# Patient Record
Sex: FEMALE | Race: WHITE | HISPANIC OR LATINO | URBAN - METROPOLITAN AREA
[De-identification: names, ages, dates, MRNs, and addresses within clinical notes are randomized per-mention and may not be internally consistent; named-entity substitution may affect disease eponyms.]

---

## 2018-05-22 ENCOUNTER — EMERGENCY (EMERGENCY)
Facility: HOSPITAL | Age: 78
LOS: 1 days | Discharge: HOME | End: 2018-05-22
Attending: EMERGENCY MEDICINE | Admitting: EMERGENCY MEDICINE
Payer: COMMERCIAL

## 2018-05-22 VITALS
RESPIRATION RATE: 18 BRPM | DIASTOLIC BLOOD PRESSURE: 74 MMHG | OXYGEN SATURATION: 100 % | SYSTOLIC BLOOD PRESSURE: 111 MMHG | WEIGHT: 134.04 LBS | TEMPERATURE: 98 F | HEART RATE: 100 BPM | HEIGHT: 63 IN

## 2018-05-22 VITALS
HEART RATE: 68 BPM | OXYGEN SATURATION: 99 % | SYSTOLIC BLOOD PRESSURE: 146 MMHG | DIASTOLIC BLOOD PRESSURE: 74 MMHG | RESPIRATION RATE: 17 BRPM | TEMPERATURE: 97 F

## 2018-05-22 LAB
ACETONE SERPL-MCNC: ABNORMAL
ALBUMIN SERPL ELPH-MCNC: 4.4 G/DL — SIGNIFICANT CHANGE UP (ref 3.3–5.2)
ALP SERPL-CCNC: 59 U/L — SIGNIFICANT CHANGE UP (ref 40–120)
ALT FLD-CCNC: 56 U/L — HIGH
ANION GAP SERPL CALC-SCNC: 19 MMOL/L — HIGH (ref 5–17)
APPEARANCE UR: CLEAR — SIGNIFICANT CHANGE UP
AST SERPL-CCNC: 67 U/L — HIGH
BACTERIA # UR AUTO: ABNORMAL
BASE EXCESS BLDV CALC-SCNC: 0.6 MMOL/L — SIGNIFICANT CHANGE UP (ref -2–2)
BASOPHILS # BLD AUTO: 0 K/UL — SIGNIFICANT CHANGE UP (ref 0–0.2)
BASOPHILS NFR BLD AUTO: 0 % — SIGNIFICANT CHANGE UP (ref 0–2)
BILIRUB SERPL-MCNC: 0.5 MG/DL — SIGNIFICANT CHANGE UP (ref 0.4–2)
BILIRUB UR-MCNC: NEGATIVE — SIGNIFICANT CHANGE UP
BUN SERPL-MCNC: 15 MG/DL — SIGNIFICANT CHANGE UP (ref 8–20)
CA-I SERPL-SCNC: 1.21 MMOL/L — SIGNIFICANT CHANGE UP (ref 1.15–1.33)
CALCIUM SERPL-MCNC: 10.5 MG/DL — HIGH (ref 8.6–10.2)
CHLORIDE BLDV-SCNC: 104 MMOL/L — SIGNIFICANT CHANGE UP (ref 98–107)
CHLORIDE SERPL-SCNC: 99 MMOL/L — SIGNIFICANT CHANGE UP (ref 98–107)
CO2 SERPL-SCNC: 22 MMOL/L — SIGNIFICANT CHANGE UP (ref 22–29)
COLOR SPEC: YELLOW — SIGNIFICANT CHANGE UP
CREAT SERPL-MCNC: 0.9 MG/DL — SIGNIFICANT CHANGE UP (ref 0.5–1.3)
DIFF PNL FLD: ABNORMAL
EOSINOPHIL # BLD AUTO: 0 K/UL — SIGNIFICANT CHANGE UP (ref 0–0.5)
EOSINOPHIL NFR BLD AUTO: 0 % — SIGNIFICANT CHANGE UP (ref 0–6)
EPI CELLS # UR: SIGNIFICANT CHANGE UP
GAS PNL BLDV: 141 MMOL/L — SIGNIFICANT CHANGE UP (ref 135–145)
GAS PNL BLDV: SIGNIFICANT CHANGE UP
GAS PNL BLDV: SIGNIFICANT CHANGE UP
GLUCOSE BLDV-MCNC: 90 MG/DL — SIGNIFICANT CHANGE UP (ref 70–99)
GLUCOSE SERPL-MCNC: 93 MG/DL — SIGNIFICANT CHANGE UP (ref 70–115)
GLUCOSE UR QL: 250 MG/DL
HCO3 BLDV-SCNC: 25 MMOL/L — SIGNIFICANT CHANGE UP (ref 21–29)
HCT VFR BLD CALC: 34.8 % — LOW (ref 37–47)
HCT VFR BLDA CALC: 38 — LOW (ref 39–50)
HGB BLD CALC-MCNC: 12.4 G/DL — SIGNIFICANT CHANGE UP (ref 11.5–15.5)
HGB BLD-MCNC: 12 G/DL — SIGNIFICANT CHANGE UP (ref 12–16)
HYALINE CASTS # UR AUTO: ABNORMAL /LPF
KETONES UR-MCNC: NEGATIVE — SIGNIFICANT CHANGE UP
LACTATE BLDV-MCNC: 2.7 MMOL/L — HIGH (ref 0.5–2)
LEUKOCYTE ESTERASE UR-ACNC: ABNORMAL
LYMPHOCYTES # BLD AUTO: 4.5 K/UL — SIGNIFICANT CHANGE UP (ref 1–4.8)
LYMPHOCYTES # BLD AUTO: 64 % — HIGH (ref 20–55)
MCHC RBC-ENTMCNC: 30.1 PG — SIGNIFICANT CHANGE UP (ref 27–31)
MCHC RBC-ENTMCNC: 34.5 G/DL — SIGNIFICANT CHANGE UP (ref 32–36)
MCV RBC AUTO: 87.2 FL — SIGNIFICANT CHANGE UP (ref 81–99)
MONOCYTES # BLD AUTO: 0.9 K/UL — HIGH (ref 0–0.8)
MONOCYTES NFR BLD AUTO: 8 % — SIGNIFICANT CHANGE UP (ref 3–10)
NEUTROPHILS # BLD AUTO: 2.6 K/UL — SIGNIFICANT CHANGE UP (ref 1.8–8)
NEUTROPHILS NFR BLD AUTO: 28 % — LOW (ref 37–73)
NITRITE UR-MCNC: POSITIVE
OTHER CELLS CSF MANUAL: 14 ML/DL — LOW (ref 18–22)
PCO2 BLDV: 41 MMHG — SIGNIFICANT CHANGE UP (ref 35–50)
PH BLDV: 7.4 — SIGNIFICANT CHANGE UP (ref 7.32–7.43)
PH UR: 5 — SIGNIFICANT CHANGE UP (ref 5–8)
PLAT MORPH BLD: NORMAL — SIGNIFICANT CHANGE UP
PLATELET # BLD AUTO: 223 K/UL — SIGNIFICANT CHANGE UP (ref 150–400)
PO2 BLDV: 43 MMHG — SIGNIFICANT CHANGE UP (ref 25–45)
POTASSIUM BLDV-SCNC: 3.2 MMOL/L — LOW (ref 3.4–4.5)
POTASSIUM SERPL-MCNC: 3.3 MMOL/L — LOW (ref 3.5–5.3)
POTASSIUM SERPL-SCNC: 3.3 MMOL/L — LOW (ref 3.5–5.3)
PROT SERPL-MCNC: 7.7 G/DL — SIGNIFICANT CHANGE UP (ref 6.6–8.7)
PROT UR-MCNC: 30 MG/DL
RBC # BLD: 3.99 M/UL — LOW (ref 4.4–5.2)
RBC # FLD: 12.2 % — SIGNIFICANT CHANGE UP (ref 11–15.6)
RBC BLD AUTO: NORMAL — SIGNIFICANT CHANGE UP
RBC CASTS # UR COMP ASSIST: ABNORMAL /HPF (ref 0–4)
SAO2 % BLDV: 81 % — SIGNIFICANT CHANGE UP
SODIUM SERPL-SCNC: 140 MMOL/L — SIGNIFICANT CHANGE UP (ref 135–145)
SP GR SPEC: 1.02 — SIGNIFICANT CHANGE UP (ref 1.01–1.02)
UROBILINOGEN FLD QL: 1 MG/DL
WBC # BLD: 8.1 K/UL — SIGNIFICANT CHANGE UP (ref 4.8–10.8)
WBC # FLD AUTO: 8.1 K/UL — SIGNIFICANT CHANGE UP (ref 4.8–10.8)
WBC UR QL: ABNORMAL

## 2018-05-22 PROCEDURE — 99283 EMERGENCY DEPT VISIT LOW MDM: CPT

## 2018-05-22 PROCEDURE — 80053 COMPREHEN METABOLIC PANEL: CPT

## 2018-05-22 PROCEDURE — 81001 URINALYSIS AUTO W/SCOPE: CPT

## 2018-05-22 PROCEDURE — 82009 KETONE BODYS QUAL: CPT

## 2018-05-22 PROCEDURE — T1013: CPT

## 2018-05-22 PROCEDURE — 36415 COLL VENOUS BLD VENIPUNCTURE: CPT

## 2018-05-22 PROCEDURE — 82803 BLOOD GASES ANY COMBINATION: CPT

## 2018-05-22 PROCEDURE — 85014 HEMATOCRIT: CPT

## 2018-05-22 PROCEDURE — 99284 EMERGENCY DEPT VISIT MOD MDM: CPT

## 2018-05-22 PROCEDURE — 82435 ASSAY OF BLOOD CHLORIDE: CPT

## 2018-05-22 PROCEDURE — 84295 ASSAY OF SERUM SODIUM: CPT

## 2018-05-22 PROCEDURE — 82962 GLUCOSE BLOOD TEST: CPT

## 2018-05-22 PROCEDURE — 82947 ASSAY GLUCOSE BLOOD QUANT: CPT

## 2018-05-22 PROCEDURE — 83605 ASSAY OF LACTIC ACID: CPT

## 2018-05-22 PROCEDURE — 84132 ASSAY OF SERUM POTASSIUM: CPT

## 2018-05-22 PROCEDURE — 82330 ASSAY OF CALCIUM: CPT

## 2018-05-22 PROCEDURE — 85027 COMPLETE CBC AUTOMATED: CPT

## 2018-05-22 RX ORDER — SODIUM CHLORIDE 9 MG/ML
2000 INJECTION INTRAMUSCULAR; INTRAVENOUS; SUBCUTANEOUS ONCE
Qty: 0 | Refills: 0 | Status: COMPLETED | OUTPATIENT
Start: 2018-05-22 | End: 2018-05-22

## 2018-05-22 RX ORDER — DONEPEZIL HYDROCHLORIDE 10 MG/1
0 TABLET, FILM COATED ORAL
Qty: 0 | Refills: 0 | COMMUNITY

## 2018-05-22 RX ORDER — POTASSIUM CHLORIDE 20 MEQ
40 PACKET (EA) ORAL ONCE
Qty: 0 | Refills: 0 | Status: COMPLETED | OUTPATIENT
Start: 2018-05-22 | End: 2018-05-22

## 2018-05-22 RX ORDER — CEPHALEXIN 500 MG
1 CAPSULE ORAL
Qty: 28 | Refills: 0 | OUTPATIENT
Start: 2018-05-22 | End: 2018-05-28

## 2018-05-22 RX ORDER — CEPHALEXIN 500 MG
500 CAPSULE ORAL ONCE
Qty: 0 | Refills: 0 | Status: COMPLETED | OUTPATIENT
Start: 2018-05-22 | End: 2018-05-22

## 2018-05-22 RX ORDER — POTASSIUM CHLORIDE 20 MEQ
4 PACKET (EA) ORAL
Qty: 4 | Refills: 0 | OUTPATIENT
Start: 2018-05-22 | End: 2018-05-22

## 2018-05-22 RX ORDER — METFORMIN HYDROCHLORIDE 850 MG/1
0 TABLET ORAL
Qty: 0 | Refills: 0 | COMMUNITY

## 2018-05-22 RX ADMIN — SODIUM CHLORIDE 2000 MILLILITER(S): 9 INJECTION INTRAMUSCULAR; INTRAVENOUS; SUBCUTANEOUS at 18:10

## 2018-05-22 RX ADMIN — Medication 500 MILLIGRAM(S): at 21:14

## 2018-05-22 RX ADMIN — Medication 40 MILLIEQUIVALENT(S): at 21:14

## 2018-05-22 NOTE — ED PROVIDER NOTE - ATTENDING CONTRIBUTION TO CARE
I, Martine Rausch, independently evaluated the patient. The APN made the initial evaluation and discussed history, physical and plan with me and I agree. I examined the patient noting RRR, lungs CTA and a soft, non-tender abdomen, and discussed lab results and the importance of proper compliance with her medication. I discussed indications to return to the ED and the importance of proper follow up with PMD. Patient verbalizes understanding and is comfortable with discharge at this time.

## 2018-05-22 NOTE — ED PROVIDER NOTE - PROGRESS NOTE DETAILS
I s/w Dr. Landers, patient does have diabetes and is taking Metformin 500mg qd. NP NOTE:  Labs and UA reviewed.  Will rx keflex QID for UTI and potassium 40 mg for tomorrow morning.  F/U with Dr. Landers this week.

## 2018-05-22 NOTE — ED PROVIDER NOTE - CARE PLAN
Principal Discharge DX:	Hyperglycemia  Secondary Diagnosis:	Hypokalemia Principal Discharge DX:	Hyperglycemia  Secondary Diagnosis:	Hypokalemia  Secondary Diagnosis:	UTI (urinary tract infection)

## 2018-05-22 NOTE — ED PROVIDER NOTE - OBJECTIVE STATEMENT
78 y/o F sent by Dr. Landers for evaluation of elevated blood glucose.  Patient and  are poor historians not knowing if she is a diabetic or what medications she is on.  A note was sent from Dr. Dao stating that she had high glucose in urine, had Novalog 15 units q1h x 2 and still had  with ketones in urine.  Patient c/o change in taste the past 2 weeks, thirst.

## 2021-11-10 NOTE — ED PROVIDER NOTE - RESPIRATORY, MLM
Breath sounds clear and equal bilaterally.
I will SWITCH the dose or number of times a day I take the medications listed below when I get home from the hospital:  None

## 2022-09-13 ENCOUNTER — OFFICE (OUTPATIENT)
Dept: URBAN - METROPOLITAN AREA CLINIC 94 | Facility: CLINIC | Age: 82
Setting detail: OPHTHALMOLOGY
End: 2022-09-13
Payer: MEDICAID

## 2022-09-13 DIAGNOSIS — H04.122: ICD-10-CM

## 2022-09-13 DIAGNOSIS — E11.9: ICD-10-CM

## 2022-09-13 DIAGNOSIS — H35.033: ICD-10-CM

## 2022-09-13 DIAGNOSIS — H04.123: ICD-10-CM

## 2022-09-13 DIAGNOSIS — Z96.1: ICD-10-CM

## 2022-09-13 DIAGNOSIS — H04.121: ICD-10-CM

## 2022-09-13 PROCEDURE — 92014 COMPRE OPH EXAM EST PT 1/>: CPT | Performed by: OPHTHALMOLOGY

## 2022-09-13 PROCEDURE — 83861 MICROFLUID ANALY TEARS: CPT | Performed by: OPHTHALMOLOGY

## 2022-09-13 ASSESSMENT — LID POSITION - PTOSIS
OD_PTOSIS: 1+ 2+
OS_PTOSIS: 1+ 2+

## 2022-09-13 ASSESSMENT — REFRACTION_AUTOREFRACTION
OS_AXIS: 040
OD_AXIS: 166
OS_SPHERE: -0.50
OD_CYLINDER: -1.50
OD_SPHERE: -0.25
OS_CYLINDER: -0.75

## 2022-09-13 ASSESSMENT — CONFRONTATIONAL VISUAL FIELD TEST (CVF)
OD_FINDINGS: FULL
OS_FINDINGS: FULL

## 2022-09-13 ASSESSMENT — TONOMETRY
OS_IOP_MMHG: 12
OD_IOP_MMHG: 11

## 2022-09-13 ASSESSMENT — KERATOMETRY
OD_K1POWER_DIOPTERS: 43.75
OS_K2POWER_DIOPTERS: 45.50
OD_K2POWER_DIOPTERS: 45.50
OS_K1POWER_DIOPTERS: 44.50
OS_AXISANGLE_DEGREES: 109
OD_AXISANGLE_DEGREES: 077

## 2022-09-13 ASSESSMENT — AXIALLENGTH_DERIVED
OD_AL: 23.5683
OS_AL: 23.3837

## 2022-09-13 ASSESSMENT — VISUAL ACUITY
OD_BCVA: 20/40
OS_BCVA: 20/40+1

## 2022-09-13 ASSESSMENT — SPHEQUIV_DERIVED
OS_SPHEQUIV: -0.875
OD_SPHEQUIV: -1

## 2022-09-13 ASSESSMENT — LID POSITION - ECTROPION
OD_ECTROPION: T 1+
OS_ECTROPION: T

## 2023-03-14 ENCOUNTER — OFFICE (OUTPATIENT)
Dept: URBAN - METROPOLITAN AREA CLINIC 40 | Facility: CLINIC | Age: 83
Setting detail: OPHTHALMOLOGY
End: 2023-03-14
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
## 2023-03-14 PROCEDURE — NO SHOW FE NO SHOW FEE: Performed by: OPHTHALMOLOGY

## 2024-03-01 ENCOUNTER — EMERGENCY (EMERGENCY)
Facility: HOSPITAL | Age: 84
LOS: 1 days | Discharge: HOME | End: 2024-03-01
Attending: STUDENT IN AN ORGANIZED HEALTH CARE EDUCATION/TRAINING PROGRAM
Payer: MEDICARE

## 2024-03-01 VITALS
RESPIRATION RATE: 18 BRPM | OXYGEN SATURATION: 99 % | HEART RATE: 71 BPM | SYSTOLIC BLOOD PRESSURE: 143 MMHG | DIASTOLIC BLOOD PRESSURE: 91 MMHG | TEMPERATURE: 98 F

## 2024-03-01 VITALS
DIASTOLIC BLOOD PRESSURE: 81 MMHG | SYSTOLIC BLOOD PRESSURE: 115 MMHG | TEMPERATURE: 98 F | WEIGHT: 293 LBS | RESPIRATION RATE: 18 BRPM | OXYGEN SATURATION: 100 % | HEART RATE: 84 BPM

## 2024-03-01 LAB
ALBUMIN SERPL ELPH-MCNC: 4.5 G/DL — SIGNIFICANT CHANGE UP (ref 3.3–5.2)
ALP SERPL-CCNC: 50 U/L — SIGNIFICANT CHANGE UP (ref 40–120)
ALT FLD-CCNC: 19 U/L — SIGNIFICANT CHANGE UP
ANION GAP SERPL CALC-SCNC: 17 MMOL/L — SIGNIFICANT CHANGE UP (ref 5–17)
APPEARANCE UR: ABNORMAL
AST SERPL-CCNC: 28 U/L — SIGNIFICANT CHANGE UP
BACTERIA # UR AUTO: ABNORMAL /HPF
BASE EXCESS BLDV CALC-SCNC: 2.1 MMOL/L — SIGNIFICANT CHANGE UP (ref -2–3)
BASOPHILS # BLD AUTO: 0.03 K/UL — SIGNIFICANT CHANGE UP (ref 0–0.2)
BASOPHILS NFR BLD AUTO: 0.4 % — SIGNIFICANT CHANGE UP (ref 0–2)
BILIRUB SERPL-MCNC: 0.5 MG/DL — SIGNIFICANT CHANGE UP (ref 0.4–2)
BILIRUB UR-MCNC: ABNORMAL
BUN SERPL-MCNC: 28.1 MG/DL — HIGH (ref 8–20)
CA-I SERPL-SCNC: 1.18 MMOL/L — SIGNIFICANT CHANGE UP (ref 1.15–1.33)
CALCIUM SERPL-MCNC: 10.1 MG/DL — SIGNIFICANT CHANGE UP (ref 8.4–10.5)
CHLORIDE BLDV-SCNC: 101 MMOL/L — SIGNIFICANT CHANGE UP (ref 96–108)
CHLORIDE SERPL-SCNC: 98 MMOL/L — SIGNIFICANT CHANGE UP (ref 96–108)
CO2 SERPL-SCNC: 24 MMOL/L — SIGNIFICANT CHANGE UP (ref 22–29)
COLOR SPEC: SIGNIFICANT CHANGE UP
CREAT SERPL-MCNC: 1.1 MG/DL — SIGNIFICANT CHANGE UP (ref 0.5–1.3)
DIFF PNL FLD: NEGATIVE — SIGNIFICANT CHANGE UP
EGFR: 49 ML/MIN/1.73M2 — LOW
EOSINOPHIL # BLD AUTO: 0.29 K/UL — SIGNIFICANT CHANGE UP (ref 0–0.5)
EOSINOPHIL NFR BLD AUTO: 4.2 % — SIGNIFICANT CHANGE UP (ref 0–6)
EPI CELLS # UR: PRESENT
GAS PNL BLDV: 137 MMOL/L — SIGNIFICANT CHANGE UP (ref 136–145)
GAS PNL BLDV: SIGNIFICANT CHANGE UP
GLUCOSE BLDV-MCNC: 105 MG/DL — HIGH (ref 70–99)
GLUCOSE SERPL-MCNC: 103 MG/DL — HIGH (ref 70–99)
GLUCOSE UR QL: NEGATIVE MG/DL — SIGNIFICANT CHANGE UP
HCO3 BLDV-SCNC: 27 MMOL/L — SIGNIFICANT CHANGE UP (ref 22–29)
HCT VFR BLD CALC: 33.3 % — LOW (ref 34.5–45)
HCT VFR BLDA CALC: 41 % — SIGNIFICANT CHANGE UP
HGB BLD CALC-MCNC: 13.7 G/DL — SIGNIFICANT CHANGE UP (ref 11.7–16.1)
HGB BLD-MCNC: 11.5 G/DL — SIGNIFICANT CHANGE UP (ref 11.5–15.5)
HYALINE CASTS # UR AUTO: PRESENT
IMM GRANULOCYTES NFR BLD AUTO: 0.1 % — SIGNIFICANT CHANGE UP (ref 0–0.9)
KETONES UR-MCNC: ABNORMAL MG/DL
LACTATE BLDV-MCNC: 2.2 MMOL/L — HIGH (ref 0.5–2)
LEUKOCYTE ESTERASE UR-ACNC: ABNORMAL
LIDOCAIN IGE QN: 43 U/L — SIGNIFICANT CHANGE UP (ref 22–51)
LYMPHOCYTES # BLD AUTO: 3.02 K/UL — SIGNIFICANT CHANGE UP (ref 1–3.3)
LYMPHOCYTES # BLD AUTO: 43.8 % — SIGNIFICANT CHANGE UP (ref 13–44)
MCHC RBC-ENTMCNC: 30.2 PG — SIGNIFICANT CHANGE UP (ref 27–34)
MCHC RBC-ENTMCNC: 34.5 GM/DL — SIGNIFICANT CHANGE UP (ref 32–36)
MCV RBC AUTO: 87.4 FL — SIGNIFICANT CHANGE UP (ref 80–100)
MONOCYTES # BLD AUTO: 0.76 K/UL — SIGNIFICANT CHANGE UP (ref 0–0.9)
MONOCYTES NFR BLD AUTO: 11 % — SIGNIFICANT CHANGE UP (ref 2–14)
NEUTROPHILS # BLD AUTO: 2.79 K/UL — SIGNIFICANT CHANGE UP (ref 1.8–7.4)
NEUTROPHILS NFR BLD AUTO: 40.5 % — LOW (ref 43–77)
NITRITE UR-MCNC: NEGATIVE — SIGNIFICANT CHANGE UP
PCO2 BLDV: 46 MMHG — HIGH (ref 39–42)
PH BLDV: 7.38 — SIGNIFICANT CHANGE UP (ref 7.32–7.43)
PH UR: 5.5 — SIGNIFICANT CHANGE UP (ref 5–8)
PLATELET # BLD AUTO: 300 K/UL — SIGNIFICANT CHANGE UP (ref 150–400)
PO2 BLDV: <42 MMHG — SIGNIFICANT CHANGE UP (ref 25–45)
POTASSIUM BLDV-SCNC: 4.2 MMOL/L — SIGNIFICANT CHANGE UP (ref 3.5–5.1)
POTASSIUM SERPL-MCNC: 4 MMOL/L — SIGNIFICANT CHANGE UP (ref 3.5–5.3)
POTASSIUM SERPL-SCNC: 4 MMOL/L — SIGNIFICANT CHANGE UP (ref 3.5–5.3)
PROT SERPL-MCNC: 7.6 G/DL — SIGNIFICANT CHANGE UP (ref 6.6–8.7)
PROT UR-MCNC: SIGNIFICANT CHANGE UP MG/DL
RBC # BLD: 3.81 M/UL — SIGNIFICANT CHANGE UP (ref 3.8–5.2)
RBC # FLD: 12.3 % — SIGNIFICANT CHANGE UP (ref 10.3–14.5)
RBC CASTS # UR COMP ASSIST: 0 /HPF — SIGNIFICANT CHANGE UP (ref 0–4)
SAO2 % BLDV: 59.1 % — SIGNIFICANT CHANGE UP
SODIUM SERPL-SCNC: 139 MMOL/L — SIGNIFICANT CHANGE UP (ref 135–145)
SP GR SPEC: 1.03 — SIGNIFICANT CHANGE UP (ref 1–1.03)
UROBILINOGEN FLD QL: 1 MG/DL — SIGNIFICANT CHANGE UP (ref 0.2–1)
WBC # BLD: 6.9 K/UL — SIGNIFICANT CHANGE UP (ref 3.8–10.5)
WBC # FLD AUTO: 6.9 K/UL — SIGNIFICANT CHANGE UP (ref 3.8–10.5)
WBC UR QL: 8 /HPF — HIGH (ref 0–5)

## 2024-03-01 PROCEDURE — 99285 EMERGENCY DEPT VISIT HI MDM: CPT

## 2024-03-01 PROCEDURE — 71045 X-RAY EXAM CHEST 1 VIEW: CPT | Mod: 26

## 2024-03-01 PROCEDURE — 93010 ELECTROCARDIOGRAM REPORT: CPT

## 2024-03-01 RX ORDER — SODIUM CHLORIDE 9 MG/ML
1000 INJECTION INTRAMUSCULAR; INTRAVENOUS; SUBCUTANEOUS ONCE
Refills: 0 | Status: COMPLETED | OUTPATIENT
Start: 2024-03-01 | End: 2024-03-01

## 2024-03-01 RX ORDER — ACETAMINOPHEN 500 MG
1000 TABLET ORAL ONCE
Refills: 0 | Status: COMPLETED | OUTPATIENT
Start: 2024-03-01 | End: 2024-03-01

## 2024-03-01 RX ORDER — CEFTRIAXONE 500 MG/1
1000 INJECTION, POWDER, FOR SOLUTION INTRAMUSCULAR; INTRAVENOUS ONCE
Refills: 0 | Status: COMPLETED | OUTPATIENT
Start: 2024-03-01 | End: 2024-03-01

## 2024-03-01 RX ADMIN — Medication 1000 MILLIGRAM(S): at 21:20

## 2024-03-01 RX ADMIN — Medication 1000 MILLIGRAM(S): at 20:36

## 2024-03-01 RX ADMIN — SODIUM CHLORIDE 1000 MILLILITER(S): 9 INJECTION INTRAMUSCULAR; INTRAVENOUS; SUBCUTANEOUS at 21:20

## 2024-03-01 RX ADMIN — CEFTRIAXONE 1000 MILLIGRAM(S): 500 INJECTION, POWDER, FOR SOLUTION INTRAMUSCULAR; INTRAVENOUS at 23:00

## 2024-03-01 RX ADMIN — SODIUM CHLORIDE 1000 MILLILITER(S): 9 INJECTION INTRAMUSCULAR; INTRAVENOUS; SUBCUTANEOUS at 20:21

## 2024-03-01 RX ADMIN — Medication 400 MILLIGRAM(S): at 20:21

## 2024-03-01 NOTE — ED PROVIDER NOTE - PHYSICAL EXAMINATION
Gen: NAD, AOx3  Head: NCAT  HEENT: EOMI, oral mucosa moist, normal conjunctiva, neck supple  Lung: CTAB, no respiratory distress  CV: rrr, no murmur, Normal perfusion  Abd: right upper quadrant pain to palpation  MSK: No edema, no visible deformities  Neuro: No focal neurologic deficits  Skin: No rash   Psych: normal affect

## 2024-03-01 NOTE — ED ADULT NURSE NOTE - BREATHING, MLM
Spontaneous, unlabored and symmetrical Estlander Flap (Lower To Upper Lip) Text: The defect of the lower lip was assessed and measured.  Given the location and size of the defect, an Estlander flap was deemed most appropriate.  Using a sterile surgical marker, an appropriate Estlander flap was measured and drawn on the upper lip. Local anesthesia was then infiltrated. A scalpel was then used to incise the lateral aspect of the flap, through skin, muscle and mucosa, leaving the flap pedicled medially.  The flap was then rotated and positioned to fill the lower lip defect.  The flap was then sutured into place with a three layer technique, closing the orbicularis oris muscle layer with subcutaneous buried sutures, followed by a mucosal layer and an epidermal layer.

## 2024-03-01 NOTE — ED PROVIDER NOTE - OBJECTIVE STATEMENT
83 year old female w/PMHx of diabetes, HTN presents to the ED with dizziness. Patient states that 15 days ago started having dull right upper quadrant abd pain, dizziness, nausea, x1 episode of vomiting. Has not been eating as much as per family, last meal yesterday "bread and juice". Normal BM and bladder movements. Denies fever, chills, CP, blurry vision, numbness, tingling or syncope. No known sick contacts. Has been increasingly more fatigued, now with SOB on exertion. No known allergies.

## 2024-03-01 NOTE — ED PROVIDER NOTE - PATIENT PORTAL LINK FT
You can access the FollowMyHealth Patient Portal offered by [facility] by registering at the following website: [email or web address]. By joining [facility]’s FollowMyHealth portal, you will also be able to view your health information using other applications (apps) compatible with our system.

## 2024-03-01 NOTE — ED ADULT NURSE NOTE - NSFALLHARMRISKINTERV_ED_ALL_ED

## 2024-03-01 NOTE — ED ADULT TRIAGE NOTE - CHIEF COMPLAINT QUOTE
Presents to ED c/o N/V and the inability to eat or hold food down. Family states that it has been going for 8 days. Endorses abdominal pain and feeling weak and tired. PMHx dementia, HTN, DM2.

## 2024-03-01 NOTE — ED PROVIDER NOTE - NSFOLLOWUPINSTRUCTIONS_ED_ALL_ED_FT
Infección urinaria en los adultos  Urinary Tract Infection, Adult    Ashley infección urinaria (IU) puede ocurrir en cualquier lugar de las vías urinarias. Las vías urinarias incluyen a los riñones, los uréteres, la vejiga y la uretra. Estos órganos fabrican, almacenan y eliminan la orina del organismo.    La IU merced afecta los uréteres y los riñones. La IU baja afecta la vejiga y la uretra.    ¿Cuáles son las causas?  La mayoría de las infecciones de las vías urinarias es causada por la presencia de bacterias en la miko genital, alrededor de la uretra, por donde sale la orina del cuerpo. Estas bacterias proliferan y causan inflamación en las vías urinarias.    ¿Qué incrementa el riesgo?  Es más probable que sufra esta afección si:  Tiene colocado un catéter urinario permanente.  No puede controlar cuándo orinar o defecar (incontinencia).  Es jonel y usted:  Utiliza espermicida o diafragma brennan método anticonceptivo.  Tiene niveles bajos de estrógenos.  Está embarazada.  Tiene ciertos genes que aumentan brock riesgo.  Es sexualmente activa.  Romana antibióticos.  Tiene ashley afección que causa que el flujo de orina sea lento, brennan:  Próstata agrandada, si usted es hombre.  Obstrucción de la uretra.  Cálculo renal.  Ashley afección nerviosa que afecta el control de la vejiga (vejiga neurógena).  No tahmina lo suficiente o no orina con frecuencia.  Tiene ciertas enfermedades crónicas, brennan:  Diabetes.  Un sistema que combate las enfermedades (sistemainmunitario) debilitado.  Anemia drepanocítica.  Gota.  Lesión en la médula quintanilla.  ¿Cuáles son los signos o síntomas?  Los síntomas de esta afección incluyen:  Necesidad inmediata (urgencia) de orinar.  Micción frecuente. Wentworth puede incluir pequeñas cantidades de orina cada vez que orina.  Ardor o dolor al orinar.  Presencia de letha en la orina.  Orina con mal olor u olor atípico.  Dificultad para orinar.  Orina turbia.  Secreción vaginal, si es jonel.  Dolor en el abdomen o en la parte inferior de la espalda.  Es posible que también tenga:  Vómitos o disminución del apetito.  Confusión.  Irritabilidad o cansancio.  Fiebre o escalofríos.  Diarrea.  El primer síntoma en los adultos mayores puede ser la confusión. En algunos casos, es posible que no tengan síntomas hasta que la infección empeore.    ¿Cómo se diagnostica?  Esta afección se diagnostica en función de ian antecedentes médicos y de un examen físico. También pueden hacerle otras pruebas, incluidas las siguientes:  Análisis de orina.  Análisis de letha.  Pruebas de infecciones de transmisión sexual (ITS).  Si ha tenido más de ashley infección urinaria (IU), se pueden hacer estudios de diagnóstico por imágenes o ashley cistoscopia para determinar la causa de las infecciones.    ¿Cómo se trata?  El tratamiento de esta afección incluye lo siguiente:  Antibióticos.  Medicamentos de venta maddie para aliviar las molestias.  Beber ashley cantidad suficiente agua para mantenerse hidratado.  Si tiene infecciones con frecuencia o tiene otras afecciones, brennan un cálculo renal, es posible que deba lex a un médico especialista en las vías urinarias (urólogo).    En casos poco frecuentes, las infecciones urinarias pueden provocar sepsis. La sepsis es ashley afección potencialmente mortal que se produce cuando el cuerpo responde a ashley infección. La sepsis se trata en el hospital con antibióticos, líquidos y otros medicamentos que se administran por vía intravenosa.    Siga estas instrucciones en brock casa:    Medicamentos    Use los medicamentos de venta maddie y los recetados solamente brennan se lo haya indicado el médico.  Si le recetaron un antibiótico, tómelo brennan se lo haya indicado el médico. No deje de usar el antibiótico aunque comience a sentirse mejor.  Instrucciones generales    Asegúrese de hacer lo siguiente:  Vaciar la vejiga con frecuencia y en brock totalidad. No contener la orina lauri largos períodos.  Vaciar la vejiga después de tener sexo.  Limpiarse de atrás hacia adelante después de orinar o defecar, si es jonel. Usar cada trozo de papel higiénico solo ashley vez cuando se limpie.  Beber suficiente líquido brennan para mantener la orina de color amarillo pálido.  Cumpla con todas las visitas de seguimiento. Wentworth es importante.  Comuníquese con un médico si:  Los síntomas no mejoran después de 1 o 2 días de tratamiento.  Los síntomas desaparecen y luego vuelven a aparecer.  Solicite ayuda de inmediato si:  Siente dolor intenso en la espalda o en la parte inferior del abdomen.  Tiene fiebre o escalofríos.  Tiene náuseas o vómitos.  Resumen  Ashley infección urinaria (IU) es ashley infección en cualquier parte de las vías urinarias, que incluyen los riñones, los uréteres, la vejiga y la uretra.  La mayoría de las infecciones de las vías urinarias es causada por bacterias en la miko genital.  El tratamiento de esta afección suele incluir antibióticos.  Si le recetaron un antibiótico, tómelo brennan se lo haya indicado el médico. No deje de usar el antibiótico aunque comience a sentirse mejor.  Cumpla con todas las visitas de seguimiento. Wentworth es importante.  Esta información no tiene brennan fin reemplazar el consejo del médico. Asegúrese de hacerle al médico cualquier pregunta que tenga.

## 2024-03-01 NOTE — ED PROVIDER NOTE - CARE PROVIDER_API CALL
Javier [place]  Gastroenterology  [address]  [place] [address]  Phone: [phone]  Fax: [phone]  Follow Up Time:

## 2024-03-01 NOTE — ED PROVIDER NOTE - ATTENDING CONTRIBUTION TO CARE
83y F w/ hx HTN, DM, presents for abdominal pain. Pt reports that over the past 2 weeks she has had persistent right sided abdominal pain, nausea, decreased appetite and generalized weakness/dizziness. No fever, chest pain, SOB. On exam, pt well appearing and in no distress. Abdomen soft, non-distended, +mild RUQ and RLQ tenderness without rebound or guarding. No CVAT. Mildly dry oral mucosa. Will check EKG, CXR, CT abd/pelvis, UA, labs. Treat with fluids, reassess.

## 2024-03-01 NOTE — ED ADULT NURSE NOTE - NS ED NOTE ABUSE RESPONSE YN
Problem: HEMODYNAMIC STATUS  Goal: Patient has stable vital signs and fluid balance  Outcome: Met This Shift     Problem: ACTIVITY INTOLERANCE/IMPAIRED MOBILITY  Goal: Mobility/activity is maintained at optimum level for patient  Outcome: Met This Shift     Problem: COMMUNICATION IMPAIRMENT  Goal: Ability to express needs and understand communication  Outcome: Met This Shift     Problem: Falls - Risk of:  Goal: Will remain free from falls  Description: Will remain free from falls  Outcome: Met This Shift  Goal: Absence of physical injury  Description: Absence of physical injury  Outcome: Met This Shift     Problem: Neurological  Goal: Maximum potential motor/sensory/cognitive function  Outcome: Met This Shift     Problem: Pain:  Goal: Pain level will decrease  Description: Pain level will decrease  Outcome: Met This Shift  Goal: Control of acute pain  Description: Control of acute pain  Outcome: Met This Shift  Goal: Control of chronic pain  Description: Control of chronic pain  Outcome: Met This Shift Yes

## 2024-03-01 NOTE — ED ADULT NURSE NOTE - OBJECTIVE STATEMENT
Pt Kazakh speaking and refusing interpretation services, wants daughter to translate at bedside. Pt is A&Ox3 to person, place, time but poor historian. Hx of dementia. Respirations are even and unlabored. Color is appropriate for race. Skin warm and dry. Pt daughter reports 8 days of abdominal pain with intermittent nausea and vomiting. Decreased PO intake. ED provider evaluating, plan of care ongoing.

## 2024-03-01 NOTE — ED PROVIDER NOTE - PROGRESS NOTE DETAILS
Liang: No acute findings on labs. Treated for UTI with rocephin. CT showing mild CBD dilation; however pt without focal RUQ tenderness and normal LFTs to suggest biliary pathology. Will give referral to GI. Results explained to family via ; they are in agreement with plan for discharge with return precautions.

## 2024-03-01 NOTE — ED PROVIDER NOTE - CLINICAL SUMMARY MEDICAL DECISION MAKING FREE TEXT BOX
83 year old female w/PMHx of diabetes, HTN presents to the ED with dizziness. Currently c/o dizziness and abd pain. Vitals stable. Patient appears well, alert and oriented. Examination significant for right upper quadrant pain to palpation. Lungs clear bilaterally. Given 1L IV saline, 1g IV ofirmev. Chest xray and blood work pending. Will obtain CT abd to assess for infectious/obstructive etiology. DDx includes failure to thrive, diverticulitis, cholecystitis, UTI, gastritis.

## 2024-03-02 PROBLEM — G30.9 ALZHEIMER'S DISEASE, UNSPECIFIED: Chronic | Status: ACTIVE | Noted: 2018-05-22

## 2024-03-02 PROBLEM — E11.9 TYPE 2 DIABETES MELLITUS WITHOUT COMPLICATIONS: Chronic | Status: ACTIVE | Noted: 2018-05-22

## 2024-03-02 PROCEDURE — 85025 COMPLETE CBC W/AUTO DIFF WBC: CPT

## 2024-03-02 PROCEDURE — 71045 X-RAY EXAM CHEST 1 VIEW: CPT

## 2024-03-02 PROCEDURE — 74177 CT ABD & PELVIS W/CONTRAST: CPT | Mod: MC

## 2024-03-02 PROCEDURE — 83605 ASSAY OF LACTIC ACID: CPT

## 2024-03-02 PROCEDURE — 36415 COLL VENOUS BLD VENIPUNCTURE: CPT

## 2024-03-02 PROCEDURE — 80053 COMPREHEN METABOLIC PANEL: CPT

## 2024-03-02 PROCEDURE — 87086 URINE CULTURE/COLONY COUNT: CPT

## 2024-03-02 PROCEDURE — 82803 BLOOD GASES ANY COMBINATION: CPT

## 2024-03-02 PROCEDURE — 85014 HEMATOCRIT: CPT

## 2024-03-02 PROCEDURE — 84132 ASSAY OF SERUM POTASSIUM: CPT

## 2024-03-02 PROCEDURE — 85018 HEMOGLOBIN: CPT

## 2024-03-02 PROCEDURE — 84295 ASSAY OF SERUM SODIUM: CPT

## 2024-03-02 PROCEDURE — 74177 CT ABD & PELVIS W/CONTRAST: CPT | Mod: 26,MC

## 2024-03-02 PROCEDURE — 82330 ASSAY OF CALCIUM: CPT

## 2024-03-02 PROCEDURE — 93005 ELECTROCARDIOGRAM TRACING: CPT

## 2024-03-02 PROCEDURE — T1013: CPT

## 2024-03-02 PROCEDURE — 81001 URINALYSIS AUTO W/SCOPE: CPT

## 2024-03-02 PROCEDURE — 82435 ASSAY OF BLOOD CHLORIDE: CPT

## 2024-03-02 PROCEDURE — 99285 EMERGENCY DEPT VISIT HI MDM: CPT | Mod: 25

## 2024-03-02 PROCEDURE — 96375 TX/PRO/DX INJ NEW DRUG ADDON: CPT

## 2024-03-02 PROCEDURE — 96361 HYDRATE IV INFUSION ADD-ON: CPT

## 2024-03-02 PROCEDURE — 83690 ASSAY OF LIPASE: CPT

## 2024-03-02 PROCEDURE — 82947 ASSAY GLUCOSE BLOOD QUANT: CPT

## 2024-03-02 PROCEDURE — 96374 THER/PROPH/DIAG INJ IV PUSH: CPT | Mod: XU

## 2024-03-02 RX ORDER — CEPHALEXIN 500 MG
1 CAPSULE ORAL
Qty: 12 | Refills: 0
Start: 2024-03-02 | End: 2024-03-05

## 2024-03-02 NOTE — ED ADULT NURSE REASSESSMENT NOTE - NS ED NURSE REASSESS COMMENT FT1
Pt is resting comfortably in stretcher. NAD. Pt is A&Ox4. Respirations are even and unlabored. Color is appropriate for race. Pt awaiting CT scan. Pt updated on plan of care.

## 2024-03-03 LAB
CULTURE RESULTS: SIGNIFICANT CHANGE UP
SPECIMEN SOURCE: SIGNIFICANT CHANGE UP

## 2024-03-25 PROBLEM — Z00.00 ENCOUNTER FOR PREVENTIVE HEALTH EXAMINATION: Status: ACTIVE | Noted: 2024-03-25

## 2024-04-03 ENCOUNTER — APPOINTMENT (OUTPATIENT)
Age: 84
End: 2024-04-03
Payer: MEDICARE

## 2024-04-03 ENCOUNTER — NON-APPOINTMENT (OUTPATIENT)
Age: 84
End: 2024-04-03

## 2024-04-03 VITALS
BODY MASS INDEX: 32.98 KG/M2 | DIASTOLIC BLOOD PRESSURE: 80 MMHG | SYSTOLIC BLOOD PRESSURE: 126 MMHG | WEIGHT: 168 LBS | HEIGHT: 60 IN

## 2024-04-03 DIAGNOSIS — K76.0 FATTY (CHANGE OF) LIVER, NOT ELSEWHERE CLASSIFIED: ICD-10-CM

## 2024-04-03 PROCEDURE — 99203 OFFICE O/P NEW LOW 30 MIN: CPT

## 2024-04-03 NOTE — ASSESSMENT
[FreeTextEntry1] : 83-year-old female presenting for HFU after CT revealed mildly nodular contour of the liver suspicious for cirrhosis. Pt denies any GI complaints.   Plan:  Will send patient for further lab work up as well as fibroscan for further evaluation. Given patients age would not recommend liver biopsy at this time. Will call patient with results. Pt agrees to plan, all questions answered. Seen and discussed with Dr. Mckeon. I spent 30 minutes on this encounter.

## 2024-04-03 NOTE — HISTORY OF PRESENT ILLNESS
[FreeTextEntry1] : Elise Torres is a 83-year-old woman with PMHx of HTN, HLD, and Diabetes type II presents today for hospital follow up. Pt seen at [facility] initially for UTI, incidentally CT scan revealed mildly nodular contour of the liver suspicious for cirrhosis. LFTs WDL in ER. Pt unable to recall last colonoscopy. Denies FMH of CRC. Denies bowel complaints, typically has bowel movements regularly without significant straining or overt bleeding such as melena or hematochezia. Denies upper GI symptoms such as GERD, nausea, vomiting, or dysphagia. Denies unintentional weight loss.

## 2024-04-03 NOTE — PHYSICAL EXAM
[Alert] : alert [Healthy Appearing] : healthy appearing [Normal Voice/Communication] : normal voice/communication [Sclera] : the sclera and conjunctiva were normal [Hearing Threshold Finger Rub Not Reynolds] : hearing was normal [Normal Lips/Gums] : the lips and gums were normal [Normal Appearance] : the appearance of the neck was normal [No Respiratory Distress] : no respiratory distress [Heart Rate And Rhythm] : heart rate was normal and rhythm regular [Auscultation Breath Sounds / Voice Sounds] : lungs were clear to auscultation bilaterally [Normal S1, S2] : normal S1 and S2 [Bowel Sounds] : normal bowel sounds [Abdomen Tenderness] : non-tender [Abdomen Soft] : soft [Abnormal Walk] : normal gait [Normal Color / Pigmentation] : normal skin color and pigmentation [Oriented To Time, Place, And Person] : oriented to person, place, and time Itraconazole Counseling:  I discussed with the patient the risks of itraconazole including but not limited to liver damage, nausea/vomiting, neuropathy, and severe allergy.  The patient understands that this medication is best absorbed when taken with acidic beverages such as non-diet cola or ginger ale.  The patient understands that monitoring is required including baseline LFTs and repeat LFTs at intervals.  The patient understands that they are to contact us or the primary physician if concerning signs are noted.

## 2024-04-04 ENCOUNTER — NON-APPOINTMENT (OUTPATIENT)
Age: 84
End: 2024-04-04

## 2024-04-04 LAB
A1AT SERPL-MCNC: 162 MG/DL
CERULOPLASMIN SERPL-MCNC: 23 MG/DL
HAV IGM SER QL: NONREACTIVE
HBV CORE IGM SER QL: NONREACTIVE
HBV SURFACE AG SER QL: NONREACTIVE
HCV AB SER QL: NONREACTIVE
HCV S/CO RATIO: 0.1 S/CO
INR PPP: 0.97 RATIO
IRON SATN MFR SERPL: 40 %
IRON SERPL-MCNC: 112 UG/DL
MITOCHONDRIA AB SER IF-ACNC: NORMAL
PT BLD: 11 SEC
TIBC SERPL-MCNC: 279 UG/DL
UIBC SERPL-MCNC: 167 UG/DL

## 2024-04-05 ENCOUNTER — NON-APPOINTMENT (OUTPATIENT)
Age: 84
End: 2024-04-05

## 2024-04-05 LAB — ANA SER IF-ACNC: NEGATIVE

## 2024-04-08 ENCOUNTER — NON-APPOINTMENT (OUTPATIENT)
Age: 84
End: 2024-04-08

## 2024-04-08 LAB
ALPHA-1-FETOPROTEIN-L3: NORMAL %
ALPHA-1-FETOPROTEIN: 2.5 NG/ML
LKM AB SER QL IF: <20.1 UNITS

## 2024-04-16 ENCOUNTER — APPOINTMENT (OUTPATIENT)
Dept: HEPATOLOGY | Facility: CLINIC | Age: 84
End: 2024-04-16
Payer: MEDICARE

## 2024-04-16 DIAGNOSIS — K74.60 UNSPECIFIED CIRRHOSIS OF LIVER: ICD-10-CM

## 2024-04-16 PROCEDURE — 76981 USE PARENCHYMA: CPT

## 2024-04-17 PROBLEM — K74.60 LIVER CIRRHOSIS: Status: ACTIVE | Noted: 2024-04-03

## 2025-02-21 NOTE — ED PROVIDER NOTE - MEDICAL DECISION MAKING DETAILS
English
Will obtain labs to r/o DKA, hydrate and reevaluate.  Awaiting a callback from Dr. Landers to get further history.